# Patient Record
Sex: MALE | Race: WHITE | Employment: FULL TIME | URBAN - METROPOLITAN AREA
[De-identification: names, ages, dates, MRNs, and addresses within clinical notes are randomized per-mention and may not be internally consistent; named-entity substitution may affect disease eponyms.]

---

## 2017-03-13 ENCOUNTER — OFFICE VISIT (OUTPATIENT)
Dept: FAMILY MEDICINE CLINIC | Age: 27
End: 2017-03-13

## 2017-03-13 VITALS
HEIGHT: 76 IN | TEMPERATURE: 98.2 F | WEIGHT: 266.4 LBS | SYSTOLIC BLOOD PRESSURE: 134 MMHG | DIASTOLIC BLOOD PRESSURE: 95 MMHG | BODY MASS INDEX: 32.44 KG/M2 | HEART RATE: 69 BPM | RESPIRATION RATE: 19 BRPM | OXYGEN SATURATION: 100 %

## 2017-03-13 DIAGNOSIS — Z00.00 WELL ADULT EXAM: ICD-10-CM

## 2017-03-13 DIAGNOSIS — R41.840 POOR CONCENTRATION: ICD-10-CM

## 2017-03-13 DIAGNOSIS — F41.9 ANXIETY: Primary | ICD-10-CM

## 2017-03-13 RX ORDER — SERTRALINE HYDROCHLORIDE 25 MG/1
25 TABLET, FILM COATED ORAL DAILY
Qty: 60 TAB | Refills: 0 | Status: SHIPPED | OUTPATIENT
Start: 2017-03-13 | End: 2017-04-25

## 2017-03-13 NOTE — PROGRESS NOTES
Chief Complaint   Patient presents with   Mindy Christy Establish Care     Pt here to establish care with PCP. Pt states that he is currently having issues focusing, Pt states that he also has social anxiety, and concerns about talking to people.

## 2017-03-13 NOTE — MR AVS SNAPSHOT
Visit Information Date & Time Provider Department Dept. Phone Encounter #  
 3/13/2017 10:30 AM Aure Rocha MD [address] [place] [address] [phone] [id number] Follow-up Instructions Return in about 2 weeks (around 3/27/2017) for follow up psych referral, anxiety. Upcoming Health Maintenance Date Due DTaP/Tdap/Td series (1 - Tdap) 5/4/2010 INFLUENZA AGE 9 TO ADULT 8/1/2016 Allergies as of 3/13/2017  Review Complete On: 3/13/2017 By: Aure Rocha MD  
 No Known Allergies Current Immunizations  Never Reviewed No immunizations on file. Not reviewed this visit You Were Diagnosed With   
  
 Codes Comments Anxiety    -  Primary ICD-10-CM: F41.9 ICD-9-CM: 300.00 Well adult exam     ICD-10-CM: Z00.00 ICD-9-CM: V70.0 Poor concentration     ICD-10-CM: R41.840 ICD-9-CM: 799.51 Vitals BP Pulse Temp Resp Height(growth percentile) Weight(growth percentile) (!) 134/95 69 98.2 °F (36.8 °C) (Oral) 19 6' 3.5\" (1.918 m) 266 lb 6.4 oz (120.8 kg) SpO2 BMI Smoking Status 100% 32.86 kg/m2 Never Smoker BMI and BSA Data Body Mass Index Body Surface Area  
 32.86 kg/m 2 2.54 m 2 Preferred Pharmacy Pharmacy Name Phone 310 [facility], [address] ([address] [phone] Your Updated Medication List  
  
   
This list is accurate as of: 3/13/17 10:58 AM.  Always use your most recent med list.  
  
  
  
  
 sertraline 25 mg tablet Commonly known as:  ZOLOFT Take 1 Tab by mouth daily. In 4 days, may take 2 tabs (50mg)  per dose, for anxiety Prescriptions Sent to Pharmacy Refills  
 sertraline (ZOLOFT) 25 mg tablet 0 Sig: Take 1 Tab by mouth daily. In 4 days, may take 2 tabs (50mg)  per dose, for anxiety  Class: Normal  
 [facility] [address] #: [phone] Route: Oral  
  
We Performed the Following REFERRAL TO PSYCHIATRY [REF91 Custom] Follow-up Instructions Return in about 2 weeks (around 3/27/2017) for follow up psych referral, anxiety. Referral Information Referral ID Referred By Referred To  
  
 [id number] ALEXANDR, [address] MD Lennie   
   [address] [facility] [address] [place] [address], [address] [facility] [address] Phone: [phone] Fax: [phone] Visits Status Start Date End Date 1 New Request 3/13/17 3/13/18 If your referral has a status of pending review or denied, additional information will be sent to support the outcome of this decision. Patient Instructions Take the zoloft (sertraline) as directed starting with one 25 mg  tablet once daily. This medication may take up to one month to work fully, but you should start feeling an improvement in mood within 4-5 days. You may increase the dose to 50 mg if your mood needs further improvement. Call me in one week or so to update me as to  how you are doing. Sertraline (By mouth) Sertraline (SER-tra-karson) Treats depression, obsessive-compulsive disorder (OCD), posttraumatic stress disorder (PTSD), premenstrual dysphoric disorder (PMDD), social anxiety disorder, and panic disorder. This medicine is an SSRI. Brand Name(s):Zoloft There may be other brand names for this medicine. When This Medicine Should Not Be Used: This medicine is not right for everyone. Do not use it if you had an allergic reaction to sertraline. How to Use This Medicine:  
Liquid, Tablet · Take your medicine as directed. Your dose may need to be changed several times to find what works best for you. You may need to take it for a few weeks or months before you feel better. · Oral liquid: Use the dropper provided to remove the medicine and mix it with 1/2 cup (4 ounces) of water, ginger ale, lemon-lime soda, lemonade, or orange juice. Drink the mixture right away. It is normal for it to look a bit hazy. · This medicine should come with a Medication Guide. Ask your pharmacist for a copy if you do not have one. · Missed dose: Take a dose as soon as you remember. If it is almost time for your next dose, wait until then and take a regular dose. Do not take extra medicine to make up for a missed dose. · Store the medicine in a closed container at room temperature, away from heat, moisture, and direct light. Drugs and Foods to Avoid: Ask your doctor or pharmacist before using any other medicine, including over-the-counter medicines, vitamins, and herbal products. · Do not use this medicine together with pimozide. Do not use this medicine and an MAO inhibitor (MAOI) within 14 days of each other. Do not use the oral liquid form of sertraline if you are also using disulfiram. 
· Some medicines can affect how sertraline works. Tell your doctor if you are using the following:  
¨ Buspirone, cimetidine, cisapride, diazepam, digitoxin, fentanyl, flecainide, lithium, phenytoin, propafenone, Kyleigh's wort, tramadol, tryptophan supplements, or valproate ¨ A blood thinner (such as warfarin), a diuretic (water pill), an NSAID pain or arthritis medicine (such as aspirin, diclofenac, ibuprofen), a tricyclic antidepressant, a triptan medicine for migraine headaches · Do not drink alcohol while you are using this medicine. Warnings While Using This Medicine: · Tell your doctor if you are pregnant or breastfeeding, or if you have liver disease, bleeding problems, glaucoma, heart disease, or a seizure disorder. · For some children, teenagers, and young adults, this medicine may increase mental or emotional problems.  This may lead to thoughts of suicide and violence. Talk with your doctor right away if you have any thoughts or behavior changes that concern you. Tell your doctor if you or anyone in your family has a history of bipolar disorder or suicide attempts. · This medicine may cause the following problems:  
¨ Serotonin syndrome (when taken with certain medicines) ¨ Low sodium levels (more common in elderly patients and those who take diuretics or become dehydrated) · Tell your doctor if you are sensitive to latex, because the oral liquid comes with a latex rubber dropper. · This medicine may make you dizzy or drowsy. Do not drive or do anything that could be dangerous until you know how this medicine affects you. · Do not stop using this medicine suddenly. Your doctor will need to slowly decrease your dose before you stop it completely. · Your doctor will check your progress and the effects of this medicine at regular visits. Keep all appointments. · Keep all medicine out of the reach of children. Never share your medicine with anyone. Possible Side Effects While Using This Medicine:  
Call your doctor right away if you notice any of these side effects: · Allergic reaction: Itching or hives, swelling in your face or hands, swelling or tingling in your mouth or throat, chest tightness, trouble breathing · Anxiety, restlessness, fast heartbeat, fever, sweating, muscle spasms, twitching, nausea, vomiting, diarrhea, seeing or hearing things that are not there · Blistering, peeling, or red skin rash · Confusion, weakness, and muscle twitching · Eye pain, vision changes, seeing halos around lights · Feeling more excited or energetic than usual 
· Thoughts of hurting yourself or others, unusual behavior · Unusual bleeding or bruising If you notice these less serious side effects, talk with your doctor: · Dry mouth · Loss of appetite, weight loss · Mild diarrhea, constipation, nausea, vomiting · Sexual problems · Sleepiness, or trouble sleeping If you notice other side effects that you think are caused by this medicine, tell your doctor. Call your doctor for medical advice about side effects. You may report side effects to FDA at 8-068-SNO-9303 © 2016 [address] is for End User's use only and may not be sold, redistributed or otherwise used for commercial purposes. The above information is an  only. It is not intended as medical advice for individual conditions or treatments. Talk to your doctor, nurse or pharmacist before following any medical regimen to see if it is safe and effective for you. Introducing [facility]! Jackie Suresh introduces PurposeMatch (formerly SPARXlife) patient portal. Now you can access parts of your medical record, email your doctor's office, and request medication refills online. 1. In your internet browser, go to [email or web address] 2. Click on the First Time User? Click Here link in the Sign In box. You will see the New Member Sign Up page. 3. Enter your PurposeMatch (formerly SPARXlife) Access Code exactly as it appears below. You will not need to use this code after youve completed the sign-up process. If you do not sign up before the expiration date, you must request a new code. · PurposeMatch (formerly SPARXlife) Access Code: [email or web address] Expires: 6/11/2017 10:51 AM 
 
4. Enter the last four digits of your Social Security Number (xxxx) and Date of Birth (mm/dd/yyyy) as indicated and click Submit. You will be taken to the next sign-up page. 5. Create a Animailt ID. This will be your PurposeMatch (formerly SPARXlife) login ID and cannot be changed, so think of one that is secure and easy to remember. 6. Create a PurposeMatch (formerly SPARXlife) password. You can change your password at any time. 7. Enter your Password Reset Question and Answer. This can be used at a later time if you forget your password. 8. Enter your e-mail address. You will receive e-mail notification when new information is available in [address] 9. Click Sign Up. You can now view and download portions of your medical record. 10. Click the Download Summary menu link to download a portable copy of your medical information. If you have questions, please visit the Frequently Asked Questions section of the Artabase website. Remember, Artabase is NOT to be used for urgent needs. For medical emergencies, dial 911. Now available from your iPhone and Android! Please provide this summary of care documentation to your next provider. If you have any questions after today's visit, please call [phone].

## 2017-03-13 NOTE — PATIENT INSTRUCTIONS
Take the zoloft (sertraline) as directed starting with one 25 mg  tablet once daily. This medication may take up to one month to work fully, but you should start feeling an improvement in mood within 4-5 days. You may increase the dose to 50 mg if your mood needs further improvement. Call me in one week or so to update me as to  how you are doing. Sertraline (By mouth)   Sertraline (SER-tra-karson)  Treats depression, obsessive-compulsive disorder (OCD), posttraumatic stress disorder (PTSD), premenstrual dysphoric disorder (PMDD), social anxiety disorder, and panic disorder. This medicine is an SSRI. Brand Name(s):Zoloft   There may be other brand names for this medicine. When This Medicine Should Not Be Used: This medicine is not right for everyone. Do not use it if you had an allergic reaction to sertraline. How to Use This Medicine:   Liquid, Tablet  · Take your medicine as directed. Your dose may need to be changed several times to find what works best for you. You may need to take it for a few weeks or months before you feel better. · Oral liquid: Use the dropper provided to remove the medicine and mix it with 1/2 cup (4 ounces) of water, ginger ale, lemon-lime soda, lemonade, or orange juice. Drink the mixture right away. It is normal for it to look a bit hazy. · This medicine should come with a Medication Guide. Ask your pharmacist for a copy if you do not have one. · Missed dose: Take a dose as soon as you remember. If it is almost time for your next dose, wait until then and take a regular dose. Do not take extra medicine to make up for a missed dose. · Store the medicine in a closed container at room temperature, away from heat, moisture, and direct light. Drugs and Foods to Avoid:   Ask your doctor or pharmacist before using any other medicine, including over-the-counter medicines, vitamins, and herbal products. · Do not use this medicine together with pimozide.  Do not use this medicine and an MAO inhibitor (MAOI) within 14 days of each other. Do not use the oral liquid form of sertraline if you are also using disulfiram.  · Some medicines can affect how sertraline works. Tell your doctor if you are using the following:   ¨ Buspirone, cimetidine, cisapride, diazepam, digitoxin, fentanyl, flecainide, lithium, phenytoin, propafenone, Kyleigh's wort, tramadol, tryptophan supplements, or valproate  ¨ A blood thinner (such as warfarin), a diuretic (water pill), an NSAID pain or arthritis medicine (such as aspirin, diclofenac, ibuprofen), a tricyclic antidepressant, a triptan medicine for migraine headaches  · Do not drink alcohol while you are using this medicine. Warnings While Using This Medicine:   · Tell your doctor if you are pregnant or breastfeeding, or if you have liver disease, bleeding problems, glaucoma, heart disease, or a seizure disorder. · For some children, teenagers, and young adults, this medicine may increase mental or emotional problems. This may lead to thoughts of suicide and violence. Talk with your doctor right away if you have any thoughts or behavior changes that concern you. Tell your doctor if you or anyone in your family has a history of bipolar disorder or suicide attempts. · This medicine may cause the following problems:   ¨ Serotonin syndrome (when taken with certain medicines)  ¨ Low sodium levels (more common in elderly patients and those who take diuretics or become dehydrated)  · Tell your doctor if you are sensitive to latex, because the oral liquid comes with a latex rubber dropper. · This medicine may make you dizzy or drowsy. Do not drive or do anything that could be dangerous until you know how this medicine affects you. · Do not stop using this medicine suddenly. Your doctor will need to slowly decrease your dose before you stop it completely. · Your doctor will check your progress and the effects of this medicine at regular visits.  Keep all appointments. · Keep all medicine out of the reach of children. Never share your medicine with anyone. Possible Side Effects While Using This Medicine:   Call your doctor right away if you notice any of these side effects:  · Allergic reaction: Itching or hives, swelling in your face or hands, swelling or tingling in your mouth or throat, chest tightness, trouble breathing  · Anxiety, restlessness, fast heartbeat, fever, sweating, muscle spasms, twitching, nausea, vomiting, diarrhea, seeing or hearing things that are not there  · Blistering, peeling, or red skin rash  · Confusion, weakness, and muscle twitching  · Eye pain, vision changes, seeing halos around lights  · Feeling more excited or energetic than usual  · Thoughts of hurting yourself or others, unusual behavior  · Unusual bleeding or bruising  If you notice these less serious side effects, talk with your doctor:   · Dry mouth  · Loss of appetite, weight loss  · Mild diarrhea, constipation, nausea, vomiting  · Sexual problems  · Sleepiness, or trouble sleeping  If you notice other side effects that you think are caused by this medicine, tell your doctor. Call your doctor for medical advice about side effects. You may report side effects to FDA at 2-517-FDA-5367  © 2016 [address] is for End User's use only and may not be sold, redistributed or otherwise used for commercial purposes. The above information is an  only. It is not intended as medical advice for individual conditions or treatments. Talk to your doctor, nurse or pharmacist before following any medical regimen to see if it is safe and effective for you.

## 2017-03-13 NOTE — PROGRESS NOTES
Subjective:   Wade Hodgkins is a 31 y.o. male presenting for his annual checkup. He is from [place], works in [employer]. Pt has c/o anxiety, social anxiety meeting strangers, hierarchy, and this interferes with his work. And relationships with others    He c/o feeling out of focus, inability to get work done in timely fashion. He has always had this problem, now interferes with work. ROS:  Feeling well. No dyspnea or chest pain on exertion. No abdominal pain, change in bowel habits, black or bloody stools. No urinary tract or prostatic symptoms. No neurological complaints. Specific concerns today:  As above. Patient Active Problem List   Diagnosis Code    Well adult exam Z00.00    Anxiety F41.9    Poor concentration R41.840            Objective:     Visit Vitals    BP (!) 134/95    Pulse 69    Temp 98.2 °F (36.8 °C) (Oral)    Resp 19    Ht 6' 3.5\" (1.918 m)    Wt 266 lb 6.4 oz (120.8 kg)    SpO2 100%    BMI 32.86 kg/m2     The patient appears well, alert, oriented x 3, in no distress. ENT normal.  Neck supple. No adenopathy or thyromegaly. ANNA. Lungs are clear, good air entry, no wheezes, rhonchi or rales. S1 and S2 normal, no murmurs, regular rate and rhythm. Abdomen is soft without tenderness, guarding, mass or organomegaly. Extremities show no edema, normal peripheral pulses. Neurological is normal without focal findings. Assessment/Plan:   healthy adult male with c/o concentration difficulty c/w ADHD, and social anxiety. 1. Well adult exam      2. Anxiety  social anxiety. No depression.   - sertraline (ZOLOFT) 25 mg tablet; Take 1 Tab by mouth daily. In 4 days, may take 2 tabs (50mg)  per dose, for anxiety  Dispense: 60 Tab; Refill: 0    3. Poor concentration   Pt would benefit from eval for ADHD.  - REFERRAL TO PSYCHIATRY      continue present plan. Follow-up Disposition:  Return in about 2 weeks (around 3/27/2017) for follow up psych referral, anxiety. .    I have discussed the diagnosis with the patient and the intended treatment plan as seen in the above orders. Patient has provided input and agrees with goals. The patient has received an after-visit summary and questions were answered concerning future plans. I have discussed medication side effects and warnings with the patient as well.

## 2017-03-28 ENCOUNTER — OFFICE VISIT (OUTPATIENT)
Dept: BEHAVIORAL/MENTAL HEALTH CLINIC | Age: 27
End: 2017-03-28

## 2017-03-28 VITALS
HEIGHT: 74 IN | BODY MASS INDEX: 33.24 KG/M2 | DIASTOLIC BLOOD PRESSURE: 88 MMHG | HEART RATE: 65 BPM | WEIGHT: 259 LBS | SYSTOLIC BLOOD PRESSURE: 134 MMHG

## 2017-03-28 DIAGNOSIS — F98.8 ADD (ATTENTION DEFICIT DISORDER): Primary | ICD-10-CM

## 2017-03-28 DIAGNOSIS — F41.9 ANXIETY: ICD-10-CM

## 2017-03-28 RX ORDER — DEXTROAMPHETAMINE SACCHARATE, AMPHETAMINE ASPARTATE MONOHYDRATE, DEXTROAMPHETAMINE SULFATE AND AMPHETAMINE SULFATE 2.5; 2.5; 2.5; 2.5 MG/1; MG/1; MG/1; MG/1
10 CAPSULE, EXTENDED RELEASE ORAL
Qty: 30 CAP | Refills: 0 | Status: SHIPPED | OUTPATIENT
Start: 2017-03-28 | End: 2017-04-25 | Stop reason: SDUPTHER

## 2017-03-28 NOTE — MR AVS SNAPSHOT
Visit Information Date & Time Provider Department Dept. Phone Encounter #  
 3/28/2017  9:00 AM [facility] [phone] [id number] Follow-up Instructions Return in about 4 weeks (around 4/25/2017). Upcoming Health Maintenance Date Due DTaP/Tdap/Td series (1 - Tdap) 5/4/2010 INFLUENZA AGE 9 TO ADULT 8/1/2016 Allergies as of 3/28/2017  Review Complete On: 3/28/2017 By: Mckenzie Sutton MD  
 No Known Allergies Current Immunizations  Never Reviewed No immunizations on file. Not reviewed this visit You Were Diagnosed With   
  
 Codes Comments ADD (attention deficit disorder)    -  Primary ICD-10-CM: F98.8 ICD-9-CM: 314.00 Vitals BP Pulse Height(growth percentile) Weight(growth percentile) BMI Smoking Status 134/88 65 6' 2\" (1.88 m) 259 lb (117.5 kg) 33.25 kg/m2 Never Smoker Vitals History BMI and BSA Data Body Mass Index Body Surface Area  
 33.25 kg/m 2 2.48 m 2 Preferred Pharmacy Pharmacy Name Phone 310 [facility] [address] ([address] [phone] Your Updated Medication List  
  
   
This list is accurate as of: 3/28/17  9:31 AM.  Always use your most recent med list.  
  
  
  
  
 amphetamine-dextroamphetamine XR 10 mg XR capsule Commonly known as:  ADDERALL XR Take 1 Cap (10 mg total) by mouth every morning. Max Daily Amount: 10 mg  
  
 sertraline 25 mg tablet Commonly known as:  ZOLOFT Take 1 Tab by mouth daily. In 4 days, may take 2 tabs (50mg)  per dose, for anxiety VITAMIN C PO Take  by mouth. Prescriptions Printed Refills  
 amphetamine-dextroamphetamine XR (ADDERALL XR) 10 mg XR capsule 0 Sig: Take 1 Cap (10 mg total) by mouth every morning. Max Daily Amount: 10 mg  
 Class: Print Route: Oral  
  
Follow-up Instructions Return in about 4 weeks (around 4/25/2017). Introducing [facility]! Yohana Espinosa introduces [employer]andware patient portal. Now you can access parts of your medical record, email your doctor's office, and request medication refills online. 1. In your internet browser, go to [email or web address] 2. Click on the First Time User? Click Here link in the Sign In box. You will see the New Member Sign Up page. 3. Enter your Demandware Access Code exactly as it appears below. You will not need to use this code after youve completed the sign-up process. If you do not sign up before the expiration date, you must request a new code. · Demandware Access Code: [email or web address] Expires: 6/11/2017 10:51 AM 
 
4. Enter the last four digits of your Social Security Number (xxxx) and Date of Birth (mm/dd/yyyy) as indicated and click Submit. You will be taken to the next sign-up page. 5. Create a Demandware ID. This will be your Demandware login ID and cannot be changed, so think of one that is secure and easy to remember. 6. Create a Demandware password. You can change your password at any time. 7. Enter your Password Reset Question and Answer. This can be used at a later time if you forget your password. 8. Enter your e-mail address. You will receive e-mail notification when new information is available in [address] 9. Click Sign Up. You can now view and download portions of your medical record. 10. Click the Download Summary menu link to download a portable copy of your medical information. If you have questions, please visit the Frequently Asked Questions section of the Demandware website. Remember, Demandware is NOT to be used for urgent needs. For medical emergencies, dial 911. Now available from your iPhone and Android! Please provide this summary of care documentation to your next provider. If you have any questions after today's visit, please call [phone].

## 2017-03-28 NOTE — PROGRESS NOTES
Ambulatory Initial Psychiatric Evaluation     Chief Complaint:   Chief Complaint   Patient presents with    New Patient     referred by PCP for ? ADD        History of Present Illness: Kinjal Campuzano is a 31 y.o. Single, White male who presents with no significant MH problems, in excellent physical health, no h/o illicit drugs or alcohol, was referred with c/o difficulties with focus and attention. 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 His job performance is average. He took 8 years to finish his BS in Science as he is a procrastinator and had to drop many classes for not finishing his assignments. His average grade in HS was B and C. He does not remember the details of his date movies or parties. His GF gets annoyed by his absent mindedness. He is in good health. His half sister has ADD. No other family h/o MH problems. Denies any legal problems.       Past Psychiatric History:     Previous psychiatric care: agrees  His PCP gave him Zoloft for anxiety on 3/12/17, which he is not taking    Previous suicide attempts: none    Previous Hospitalizations: denies  none    Previous psychiatric medications/ECT/TMS: denies  none          History of rehab, detox, withdrawal: none    Social History:   Social History     Social History    Marital status: UNKNOWN     Spouse name: N/A    Number of children: N/A    Years of education: N/A     Social History Main Topics    Smoking status: Never Smoker    Smokeless tobacco: None    Alcohol use Yes      Comment: occasionally    Drug use: No    Sexual activity: Yes     Partners: Female     Birth control/ protection: Condom     Other Topics Concern    None     Social History Narrative        Ethnic:   Relationship Status: single  Living Situation: Alone   Employment: Works for [employer] in the field. X 2 years. Has BS degree  Tobacco/Caffeine/Alchol use: no alcohol use  Illicit Drug Social History:  no history of illicit drug use  Hobbies:  music  Sexual:  heterosexual    Family History:   History reviewed. No pertinent family history. Past Medical History:   Past Medical History:   Diagnosis Date    Migraine     started in middle school         Allergies:   No Known Allergies     Medication List:   Current Outpatient Prescriptions   Medication Sig Dispense Refill    ASCORBATE CALCIUM (VITAMIN C PO) Take  by mouth.  amphetamine-dextroamphetamine XR (ADDERALL XR) 10 mg XR capsule Take 1 Cap (10 mg total) by mouth every morning. Max Daily Amount: 10 mg 30 Cap 0    sertraline (ZOLOFT) 25 mg tablet Take 1 Tab by mouth daily. In 4 days, may take 2 tabs (50mg)  per dose, for anxiety 60 Tab 0      ROS:  Constitutional: negative for fevers and fatigue  Respiratory: negative for cough or wheezing  Cardiovascular: negative for chest pain, fatigue  Gastrointestinal: negative for reflux symptoms, nausea and vomiting  Musculoskeletal:negative for myalgias and stiff joints   Behavioral: denies depression or anxiety    Psychiatric/Mental Status Examination: This is a young WM with pretty good presentation of mild ADD.      MENTAL STATUS EXAM:  Sensorium  oriented to time, place and person   Orientation person, place, time/date, situation, day of week, month of year and year   Relations cooperative   Eye Contact appropriate   Appearance:  age appropriate and casually dressed   Motor Behavior:  within normal limits   Speech:  normal pitch and normal volume   Vocabulary average   Thought Process: goal directed and logical   Thought Content free of delusions and free of hallucinations   Suicidal ideations none   Homicidal ideations none   Mood:  euthymic   Affect:  anxious   Memory recent  adequate   Memory remote:  adequate   Concentration:  adequate   Abstraction:  abstract   Insight:  good   Reliability good Judgment:  good   Diagnoses:   Axis I: ADD, Anxiety d/o  Axis II: Deferred  Axis III: none  Axis IV: Occupational problems and Other psychosocial or environmental problems  Axis V:51-60 moderate symptoms    Treatment Plan:   1. Medication: begin Adderall XR 10 mg, 1 PO AM  2. Discussed: the potential medication side effects  headache, insomnia, libido increased, weight loss  patient given opportunity to ask questions  3. Psychotherapy: none recommended at this time  4. Medical: with PCP  5. Return to Clinic: Follow-up Disposition:  Return in about 4 weeks (around 4/25/2017). The risk versus benefits of treatment were discussed and side effects explained. Patient agreed with plan. Patient instructed to call with any side effects.      Time spent with Patient:  30 to 74 minutes    Leonel Santoro MD  3/28/2017

## 2017-04-25 ENCOUNTER — OFFICE VISIT (OUTPATIENT)
Dept: BEHAVIORAL/MENTAL HEALTH CLINIC | Age: 27
End: 2017-04-25

## 2017-04-25 VITALS
HEART RATE: 67 BPM | WEIGHT: 252 LBS | DIASTOLIC BLOOD PRESSURE: 98 MMHG | BODY MASS INDEX: 32.34 KG/M2 | SYSTOLIC BLOOD PRESSURE: 133 MMHG | HEIGHT: 74 IN

## 2017-04-25 DIAGNOSIS — F98.8 ADD (ATTENTION DEFICIT DISORDER): Primary | ICD-10-CM

## 2017-04-25 DIAGNOSIS — F41.9 ANXIETY: ICD-10-CM

## 2017-04-25 RX ORDER — DEXTROAMPHETAMINE SACCHARATE, AMPHETAMINE ASPARTATE MONOHYDRATE, DEXTROAMPHETAMINE SULFATE AND AMPHETAMINE SULFATE 5; 5; 5; 5 MG/1; MG/1; MG/1; MG/1
20 CAPSULE, EXTENDED RELEASE ORAL
Qty: 30 CAP | Refills: 0 | Status: SHIPPED | OUTPATIENT
Start: 2017-04-25 | End: 2017-04-25 | Stop reason: SDUPTHER

## 2017-04-25 RX ORDER — DEXTROAMPHETAMINE SACCHARATE, AMPHETAMINE ASPARTATE MONOHYDRATE, DEXTROAMPHETAMINE SULFATE AND AMPHETAMINE SULFATE 5; 5; 5; 5 MG/1; MG/1; MG/1; MG/1
20 CAPSULE, EXTENDED RELEASE ORAL
Qty: 30 CAP | Refills: 0 | Status: SHIPPED | OUTPATIENT
Start: 2017-05-25 | End: 2017-06-24

## 2017-04-25 NOTE — MR AVS SNAPSHOT
Visit Information Date & Time Provider Department Dept. Phone Encounter #  
 4/25/2017  9:20 AM [facility] [phone] [id number] Follow-up Instructions Return in about 2 months (around 6/25/2017). Upcoming Health Maintenance Date Due DTaP/Tdap/Td series (1 - Tdap) 5/4/2010 INFLUENZA AGE 9 TO ADULT 8/1/2016 Allergies as of 4/25/2017  Review Complete On: 4/25/2017 By: Tawanna Sheriff MD  
 No Known Allergies Current Immunizations  Never Reviewed No immunizations on file. Not reviewed this visit You Were Diagnosed With   
  
 Codes Comments ADD (attention deficit disorder)    -  Primary ICD-10-CM: F98.8 ICD-9-CM: 314.00 Anxiety     ICD-10-CM: F41.9 ICD-9-CM: 300.00 Vitals BP Pulse Height(growth percentile) Weight(growth percentile) BMI Smoking Status (!) 133/98 67 6' 2\" (1.88 m) 252 lb (114.3 kg) 32.35 kg/m2 Never Smoker Vitals History BMI and BSA Data Body Mass Index Body Surface Area  
 32.35 kg/m 2 2.44 m 2 Preferred Pharmacy Pharmacy Name Phone [address] [facility], [address] ([address] [phone] Your Updated Medication List  
  
   
This list is accurate as of: 4/25/17  9:34 AM.  Always use your most recent med list.  
  
  
  
  
 amphetamine-dextroamphetamine XR 20 mg XR capsule Commonly known as:  ADDERALL XR Take 1 Cap (20 mg total) by mouth daily (after breakfast)Indications: ATTENTION-DEFICIT HYPERACTIVITY DISORDER Earliest Fill Date: 5/25/17. Max Daily Amount: 20 mg  
Start taking on:  5/25/2017  
  
 sertraline 25 mg tablet Commonly known as:  ZOLOFT Take 1 Tab by mouth daily. In 4 days, may take 2 tabs (50mg)  per dose, for anxiety VITAMIN C PO Take  by mouth. Prescriptions Printed Refills amphetamine-dextroamphetamine XR (ADDERALL XR) 20 mg XR capsule 0 Starting on: 5/25/2017 Sig: Take 1 Cap (20 mg total) by mouth daily (after breakfast)Indications: ATTENTION-DEFICIT HYPERACTIVITY DISORDER Earliest Fill Date: 5/25/17. Max Daily Amount: 20 mg  
 Class: Print Route: Oral  
  
Follow-up Instructions Return in about 2 months (around 6/25/2017). Introducing [facility] & [facility] SERVICES! [employer] introduces [employer]itt patient portal. Now you can access parts of your medical record, email your doctor's office, and request medication refills online. 1. In your internet browser, go to [email or web address] 2. Click on the First Time User? Click Here link in the Sign In box. You will see the New Member Sign Up page. 3. Enter your Docitt Access Code exactly as it appears below. You will not need to use this code after youve completed the sign-up process. If you do not sign up before the expiration date, you must request a new code. · Docitt Access Code: [email or web address] Expires: 6/11/2017 10:51 AM 
 
4. Enter the last four digits of your Social Security Number (xxxx) and Date of Birth (mm/dd/yyyy) as indicated and click Submit. You will be taken to the next sign-up page. 5. Create a Docitt ID. This will be your Docitt login ID and cannot be changed, so think of one that is secure and easy to remember. 6. Create a Docitt password. You can change your password at any time. 7. Enter your Password Reset Question and Answer. This can be used at a later time if you forget your password. 8. Enter your e-mail address. You will receive e-mail notification when new information is available in [address] 9. Click Sign Up. You can now view and download portions of your medical record. 10. Click the Download Summary menu link to download a portable copy of your medical information.  
 
If you have questions, please visit the Frequently Asked Questions section of the [employer]. Remember, [employer] is NOT to be used for urgent needs. For medical emergencies, dial 911. Now available from your iPhone and Android! Please provide this summary of care documentation to your next provider. If you have any questions after today's visit, please call [phone].

## 2017-04-25 NOTE — PROGRESS NOTES
Psychiatric Outpatient Progress Note    Account Number:  [de-identified]  Name: Caren Telles    SUBJECTIVE:   CHIEF COMPLAINT:  Caren Telles is a 31 y.o. Single, White male and was seen today for his first follow-up of psychiatric condition and psychotropic medication management. HPI:    Ajith Villareal reports the following psychiatric symptoms:  anxiety and ADD. The symptoms have been present for years3 and are of moderate severity. The symptoms occur daily. Pt was started on low dose Adderall last visit. He reports that he is almost 50% better. Later in the evening the effects does fizzles out. He has lost few lbs. His DBP is little high. Reports compliance with medication. Denies feeling high on Adderall at any time. No gross psychosis or briana. Adult ADHD Self-Report Scale Symptom Checklist- reviewed- consistent with Dx of ADD. Contributing factors include: no much work due to rain. Patient denies SI/HI/SIB. Side Effects:  none      Fam/Soc Hx (from [place] with updates):       REVIEW OF SYSTEMS:  Psychiatric:  ADD  Appetite:good - lost 7 lbs   Sleep: good       OBJECTIVE:                 Mental Status exam: WNL except for      Sensorium  oriented to time, place and person   Relations cooperative    Eye Contact    appropriate   Appearance:  age appropriate and casually dressed   Motor Behavior/Gait:  within normal limits   Speech:  normal pitch and normal volume   Thought Process: goal directed and logical   Thought Content free of delusions and free of hallucinations   Suicidal ideations none   Homicidal ideations none   Mood:  euthymic   Affect:  full range   Memory recent  adequate   Memory remote:  adequate   Concentration:  adequate   Abstraction:  concrete   Insight:  fair   Reliability fair   Judgment:  fair       MEDICAL DECISION MAKING  Data: pertinent labs, imaging, medical records and diagnostic tests reviewed and incorporated in diagnosis and treatment plan    No Known Allergies     Current Outpatient Prescriptions   Medication Sig Dispense Refill    [START ON 5/25/2017] amphetamine-dextroamphetamine XR (ADDERALL XR) 20 mg XR capsule Take 1 Cap (20 mg total) by mouth daily (after breakfast)Indications: ATTENTION-DEFICIT HYPERACTIVITY DISORDER Earliest Fill Date: 5/25/17. Max Daily Amount: 20 mg 30 Cap 0    ASCORBATE CALCIUM (VITAMIN C PO) Take  by mouth. Visit Vitals    BP (!) 133/98    Pulse 67    Ht 6' 2\" (1.88 m)    Wt 114.3 kg (252 lb)    BMI 32.35 kg/m2         Problems addressed today:    ICD-10-CM ICD-9-CM    1. ADD (attention deficit disorder) F98.8 314.00    2. Anxiety F41.9 300.00        Assessment:   Keanu Chauhan  is a 31 y.o.  male  is responding to treatment. Symptoms are improving. Patient denies SI/HI/SIB. No evidence of AH/VH or delusions. Risk Scoring- chronic illnesses and prescription drug management    Treatment Plan:  1. Medications:          Medication Changes/Adjustments: Increase Adderall XR 20 mg, 1 PO daily . Two hard prescriptions given. Current Outpatient Prescriptions   Medication Sig Dispense Refill    [START ON 5/25/2017] amphetamine-dextroamphetamine XR (ADDERALL XR) 20 mg XR capsule Take 1 Cap (20 mg total) by mouth daily (after breakfast)Indications: ATTENTION-DEFICIT HYPERACTIVITY DISORDER Earliest Fill Date: 5/25/17. Max Daily Amount: 20 mg 30 Cap 0    ASCORBATE CALCIUM (VITAMIN C PO) Take  by mouth. The following regarding medications was addressed:    (The risks and benefits of the proposed medication; the potential medication side effects ie    dry mouth, weight gain, GI upset, headache; patient given opportunity to ask questions)       2. Counseling and coordination of care including instructions for treatment, risks/benefits, risk factor reduction and patient/family education. He agrees with the plan. Patient instructed to call with any side effects, questions or issues.      3.  Follow-up Disposition:  Return in about 2 months (around 6/25/2017). 4. Strongly recommended to keep her bottle of Addrall in a safe place. PSYCHOTHERAPY:  approx 20 minutes  Type:  Supportive/Solution Focused psychotherapy provided  Focus:     Current problems:         Psychoeducation provided on Adderall. Treatment plan reviewed with patient-including diagnosis and medications    Wei San is progressing.     Lyn Paris MD  4/25/2017

## 2017-06-29 ENCOUNTER — OFFICE VISIT (OUTPATIENT)
Dept: BEHAVIORAL/MENTAL HEALTH CLINIC | Age: 27
End: 2017-06-29

## 2017-06-29 VITALS
SYSTOLIC BLOOD PRESSURE: 134 MMHG | DIASTOLIC BLOOD PRESSURE: 86 MMHG | BODY MASS INDEX: 32.21 KG/M2 | WEIGHT: 251 LBS | HEART RATE: 78 BPM | HEIGHT: 74 IN

## 2017-06-29 DIAGNOSIS — F41.9 ANXIETY: ICD-10-CM

## 2017-06-29 DIAGNOSIS — F98.8 ADD (ATTENTION DEFICIT DISORDER): Primary | ICD-10-CM

## 2017-06-29 PROBLEM — R41.840 POOR CONCENTRATION: Status: RESOLVED | Noted: 2017-03-13 | Resolved: 2017-06-29

## 2017-06-29 RX ORDER — DEXTROAMPHETAMINE SACCHARATE, AMPHETAMINE ASPARTATE MONOHYDRATE, DEXTROAMPHETAMINE SULFATE AND AMPHETAMINE SULFATE 2.5; 2.5; 2.5; 2.5 MG/1; MG/1; MG/1; MG/1
20 CAPSULE, EXTENDED RELEASE ORAL DAILY
Refills: 0 | COMMUNITY
Start: 2017-03-28 | End: 2017-06-29 | Stop reason: DRUGHIGH

## 2017-06-29 RX ORDER — DEXTROAMPHETAMINE SACCHARATE, AMPHETAMINE ASPARTATE MONOHYDRATE, DEXTROAMPHETAMINE SULFATE AND AMPHETAMINE SULFATE 5; 5; 5; 5 MG/1; MG/1; MG/1; MG/1
20 CAPSULE, EXTENDED RELEASE ORAL DAILY
Qty: 30 CAP | Refills: 0 | Status: SHIPPED | OUTPATIENT
Start: 2017-07-29 | End: 2017-06-29 | Stop reason: SDUPTHER

## 2017-06-29 RX ORDER — DEXTROAMPHETAMINE SACCHARATE, AMPHETAMINE ASPARTATE MONOHYDRATE, DEXTROAMPHETAMINE SULFATE AND AMPHETAMINE SULFATE 5; 5; 5; 5 MG/1; MG/1; MG/1; MG/1
20 CAPSULE, EXTENDED RELEASE ORAL DAILY
Qty: 30 CAP | Refills: 0 | Status: SHIPPED | OUTPATIENT
Start: 2017-08-29 | End: 2017-09-26 | Stop reason: SDUPTHER

## 2017-06-29 RX ORDER — DEXTROAMPHETAMINE SACCHARATE, AMPHETAMINE ASPARTATE MONOHYDRATE, DEXTROAMPHETAMINE SULFATE AND AMPHETAMINE SULFATE 5; 5; 5; 5 MG/1; MG/1; MG/1; MG/1
20 CAPSULE, EXTENDED RELEASE ORAL DAILY
Qty: 30 CAP | Refills: 0 | Status: SHIPPED | OUTPATIENT
Start: 2017-06-29 | End: 2017-06-29 | Stop reason: SDUPTHER

## 2017-06-29 NOTE — MR AVS SNAPSHOT
Visit Information Date & Time Provider Department Dept. Phone Encounter #  
 6/29/2017  8:40 AM Savage Ansari, [address] [facility] [phone] [id number] Follow-up Instructions Return in about 3 months (around 9/29/2017). Upcoming Health Maintenance Date Due DTaP/Tdap/Td series (1 - Tdap) 5/4/2010 INFLUENZA AGE 9 TO ADULT 8/1/2017 Allergies as of 6/29/2017  Review Complete On: 6/29/2017 By: Savage Ansari MD  
 No Known Allergies Current Immunizations  Never Reviewed No immunizations on file. Not reviewed this visit You Were Diagnosed With   
  
 Codes Comments ADD (attention deficit disorder)    -  Primary ICD-10-CM: F98.8 ICD-9-CM: 314.00 Anxiety     ICD-10-CM: F41.9 ICD-9-CM: 300.00 Vitals BP Pulse Height(growth percentile) Weight(growth percentile) BMI Smoking Status 134/86 78 6' 2\" (1.88 m) 251 lb (113.9 kg) 32.23 kg/m2 Never Smoker Vitals History BMI and BSA Data Body Mass Index Body Surface Area  
 32.23 kg/m 2 2.44 m 2 Preferred Pharmacy Pharmacy Name Phone [address] [facility], [address] ([address] [phone] Your Updated Medication List  
  
   
This list is accurate as of: 6/29/17  9:01 AM.  Always use your most recent med list.  
  
  
  
  
 amphetamine-dextroamphetamine XR 20 mg XR capsule Commonly known as:  ADDERALL XR Take 1 Cap (20 mg total) by mouth dailyIndications: ATTENTION-DEFICIT HYPERACTIVITY DISORDER Earliest Fill Date: 8/29/17. Max Daily Amount: 20 mg  
Start taking on:  8/29/2017 VITAMIN C PO Take  by mouth. Prescriptions Printed Refills  
 amphetamine-dextroamphetamine XR (ADDERALL XR) 20 mg XR capsule 0 Starting on: 8/29/2017  Sig: Take 1 Cap (20 mg total) by mouth dailyIndications: ATTENTION-DEFICIT HYPERACTIVITY DISORDER Earliest Fill Date: 8/29/17. Max Daily Amount: 20 mg  
 Class: Print Route: Oral  
  
Follow-up Instructions Return in about 3 months (around 9/29/2017). Introducing [facility] & [facility] SERVICES! Antonella Cooper introduces [employer]Star patient portal. Now you can access parts of your medical record, email your doctor's office, and request medication refills online. 1. In your internet browser, go to [email or web address] 2. Click on the First Time User? Click Here link in the Sign In box. You will see the New Member Sign Up page. 3. Enter your PrismaStar Access Code exactly as it appears below. You will not need to use this code after youve completed the sign-up process. If you do not sign up before the expiration date, you must request a new code. · PrismaStar Access Code: [email or web address] Expires: 9/27/2017  9:01 AM 
 
4. Enter the last four digits of your Social Security Number (xxxx) and Date of Birth (mm/dd/yyyy) as indicated and click Submit. You will be taken to the next sign-up page. 5. Create a PrismaStar ID. This will be your PrismaStar login ID and cannot be changed, so think of one that is secure and easy to remember. 6. Create a PrismaStar password. You can change your password at any time. 7. Enter your Password Reset Question and Answer. This can be used at a later time if you forget your password. 8. Enter your e-mail address. You will receive e-mail notification when new information is available in [address] 9. Click Sign Up. You can now view and download portions of your medical record. 10. Click the Download Summary menu link to download a portable copy of your medical information. If you have questions, please visit the Frequently Asked Questions section of the PrismaStar website. Remember, [employer]Star is NOT to be used for urgent needs. For medical emergencies, dial 911. Now available from your iPhone and Android! Please provide this summary of care documentation to your next provider. If you have any questions after today's visit, please call [phone].

## 2017-09-26 ENCOUNTER — OFFICE VISIT (OUTPATIENT)
Dept: BEHAVIORAL/MENTAL HEALTH CLINIC | Age: 27
End: 2017-09-26

## 2017-09-26 VITALS
WEIGHT: 250 LBS | DIASTOLIC BLOOD PRESSURE: 88 MMHG | HEIGHT: 74 IN | BODY MASS INDEX: 32.08 KG/M2 | SYSTOLIC BLOOD PRESSURE: 127 MMHG | HEART RATE: 77 BPM

## 2017-09-26 DIAGNOSIS — F98.8 ADD (ATTENTION DEFICIT DISORDER): Primary | ICD-10-CM

## 2017-09-26 RX ORDER — DEXTROAMPHETAMINE SACCHARATE, AMPHETAMINE ASPARTATE MONOHYDRATE, DEXTROAMPHETAMINE SULFATE AND AMPHETAMINE SULFATE 5; 5; 5; 5 MG/1; MG/1; MG/1; MG/1
20 CAPSULE, EXTENDED RELEASE ORAL DAILY
Qty: 90 CAP | Refills: 0 | Status: SHIPPED | OUTPATIENT
Start: 2017-09-26 | End: 2017-10-04 | Stop reason: SDUPTHER

## 2017-09-26 NOTE — PROGRESS NOTES
Psychiatric Outpatient Progress Note    Account Number:  [de-identified]  Name: Ji Arzate    SUBJECTIVE:    CHIEF COMPLAINT:  Dante Moore a 28 y.o. Single, White male and was seen today for his 3 month follow-up of psychiatric condition and psychotropic medication management.       HPI:    Manpreet reports the following psychiatric symptoms:  anxiety and ADD.  The symptoms have been present for years  and are of mild severity. The symptoms occur daily. Pt  reports that he is much better. Reports compliance with medication. Stable sleep/appetite. No gross psychosis or briana. Reports work is good and busy. There was major shake up in his company and he survived the cuts. He and his GF are planning to take vacations in [place] soon. He also wanted to get my opinion on his sister's ADD medication.       Adult ADHD Self-Report Scale Symptom Checklist- reviewed- consistent with Dx of ADD.        Contributing factors include: none   Patient denies SI/HI/SIB.        Side Effects:  none        Fam/Soc Hx (from [facility] with updates):        REVIEW OF SYSTEMS:  Psychiatric:  ADD  Appetite:good   Sleep: good       OBJECTIVE:                 Mental Status exam: WNL except for      Sensorium  oriented to time, place and person   Relations cooperative    Eye Contact    appropriate   Appearance:  age appropriate and casually dressed   Motor Behavior/Gait:  within normal limits   Speech:  normal pitch and normal volume   Thought Process: logical   Thought Content free of delusions and free of hallucinations   Suicidal ideations none   Homicidal ideations none   Mood:  euthymic   Affect:  Broad and full   Memory recent  adequate   Memory remote:  adequate   Concentration:  adequate   Abstraction:  concrete   Insight:  fair   Reliability fair   Judgment:  fair       MEDICAL DECISION MAKING  Data: pertinent labs, imaging, medical records and diagnostic tests reviewed and incorporated in diagnosis and treatment plan    No Known Allergies Current Outpatient Prescriptions   Medication Sig Dispense Refill    amphetamine-dextroamphetamine XR (ADDERALL XR) 20 mg XR capsule Take 1 Cap (20 mg total) by mouth dailyIndications: ATTENTION-DEFICIT HYPERACTIVITY DISORDER. Max Daily Amount: 20 mg 90 Cap 0    ASCORBATE CALCIUM (VITAMIN C PO) Take  by mouth. Visit Vitals    /88    Pulse 77    Ht 6' 2\" (1.88 m)    Wt 113.4 kg (250 lb)    BMI 32.1 kg/m2         Problems addressed today:    ICD-10-CM ICD-9-CM    1. ADD (attention deficit disorder) F98.8 314.00        Assessment:   Renita Olivera  is a 28 y.o.  male  is responding to treatment. Symptoms are stable. Patient denies SI/HI/SIB. No evidence of AH/VH or delusions. Risk Scoring- chronic illnesses and prescription drug management    Treatment Plan:  1. Medications:          Medication Changes/Adjustments: continue Addrrall XR    Current Outpatient Prescriptions   Medication Sig Dispense Refill    amphetamine-dextroamphetamine XR (ADDERALL XR) 20 mg XR capsule Take 1 Cap (20 mg total) by mouth dailyIndications: ATTENTION-DEFICIT HYPERACTIVITY DISORDER. Max Daily Amount: 20 mg 90 Cap 0    ASCORBATE CALCIUM (VITAMIN C PO) Take  by mouth. The following regarding medications was addressed:    (The risks and benefits of the proposed medication; the potential medication side effects ie    dry mouth, weight gain, GI upset, headache; patient given opportunity to ask questions)       2. Counseling and coordination of care including instructions for treatment, risks/benefits, risk factor reduction and patient/family education. He agrees with the plan. Patient instructed to call with any side effects, questions or issues. 3.  Follow-up Disposition:  Return in about 3 months (around 12/26/2017).     PSYCHOTHERAPY:  approx 20 minutes  Type:  Supportive/Solution Focused psychotherapy provided  Focus:     Current problems:   Occupational issues     Psychoeducation provided on psych medication    Treatment plan reviewed with patient-including diagnosis and medications      Priyank Cee is progressing.     Robinson Nguyen MD  9/26/2017

## 2017-09-26 NOTE — MR AVS SNAPSHOT
Visit Information Date & Time Provider Department Dept. Phone Encounter #  
 9/26/2017  9:00 AM [place] [address] [phone] [id number] Follow-up Instructions Return in about 3 months (around 12/26/2017). Upcoming Health Maintenance Date Due DTaP/Tdap/Td series (1 - Tdap) 5/4/2010 INFLUENZA AGE 9 TO ADULT 8/1/2017 Allergies as of 9/26/2017  Review Complete On: 9/26/2017 By: Morris Isaacs MD  
 No Known Allergies Current Immunizations  Never Reviewed No immunizations on file. Not reviewed this visit You Were Diagnosed With   
  
 Codes Comments ADD (attention deficit disorder)    -  Primary ICD-10-CM: F98.8 ICD-9-CM: 314.00 Vitals BP Pulse Height(growth percentile) Weight(growth percentile) BMI Smoking Status 127/88 77 6' 2\" (1.88 m) 250 lb (113.4 kg) 32.1 kg/m2 Never Smoker BMI and BSA Data Body Mass Index Body Surface Area  
 32.1 kg/m 2 2.43 m 2 Preferred Pharmacy Pharmacy Name Phone 310 [facility] 53 91 [address] ([address] [phone] Your Updated Medication List  
  
   
This list is accurate as of: 9/26/17  9:05 AM.  Always use your most recent med list.  
  
  
  
  
 amphetamine-dextroamphetamine XR 20 mg XR capsule Commonly known as:  ADDERALL XR Take 1 Cap (20 mg total) by mouth dailyIndications: ATTENTION-DEFICIT HYPERACTIVITY DISORDER. Max Daily Amount: 20 mg  
  
 VITAMIN C PO Take  by mouth. Prescriptions Printed Refills  
 amphetamine-dextroamphetamine XR (ADDERALL XR) 20 mg XR capsule 0 Sig: Take 1 Cap (20 mg total) by mouth dailyIndications: ATTENTION-DEFICIT HYPERACTIVITY DISORDER. Max Daily Amount: 20 mg  
 Class: Print Route: Oral  
  
Follow-up Instructions Return in about 3 months (around 12/26/2017). Introducing [facility] & HEALTH SERVICES! Adan Rose introduces [employer] patient portal. Now you can access parts of your medical record, email your doctor's office, and request medication refills online. 1. In your internet browser, go to [email or web address]/[email or web address] 2. Click on the First Time User? Click Here link in the Sign In box. You will see the New Member Sign Up page. 3. Enter your [employer] Access Code exactly as it appears below. You will not need to use this code after youve completed the sign-up process. If you do not sign up before the expiration date, you must request a new code. · [employer] Access Code: [email or web address] Expires: 9/27/2017  9:01 AM 
 
4. Enter the last four digits of your Social Security Number (xxxx) and Date of Birth (mm/dd/yyyy) as indicated and click Submit. You will be taken to the next sign-up page. 5. Create a [employer] ID. This will be your [employer] login ID and cannot be changed, so think of one that is secure and easy to remember. 6. Create a [employer] password. You can change your password at any time. 7. Enter your Password Reset Question and Answer. This can be used at a later time if you forget your password. 8. Enter your e-mail address. You will receive e-mail notification when new information is available in [address] 9. Click Sign Up. You can now view and download portions of your medical record. 10. Click the Download Summary menu link to download a portable copy of your medical information. If you have questions, please visit the Frequently Asked Questions section of the [employer] website. Remember, [employer] is NOT to be used for urgent needs. For medical emergencies, dial 911. Now available from your iPhone and Android! Please provide this summary of care documentation to your next provider. If you have any questions after today's visit, please call [phone].

## 2017-10-04 ENCOUNTER — TELEPHONE (OUTPATIENT)
Dept: BEHAVIORAL/MENTAL HEALTH CLINIC | Age: 27
End: 2017-10-04

## 2017-10-04 RX ORDER — DEXTROAMPHETAMINE SACCHARATE, AMPHETAMINE ASPARTATE MONOHYDRATE, DEXTROAMPHETAMINE SULFATE AND AMPHETAMINE SULFATE 5; 5; 5; 5 MG/1; MG/1; MG/1; MG/1
20 CAPSULE, EXTENDED RELEASE ORAL DAILY
Qty: 30 CAP | Refills: 0 | Status: SHIPPED | OUTPATIENT
Start: 2017-10-30 | End: 2017-12-05 | Stop reason: SDUPTHER

## 2017-10-04 RX ORDER — DEXTROAMPHETAMINE SACCHARATE, AMPHETAMINE ASPARTATE MONOHYDRATE, DEXTROAMPHETAMINE SULFATE AND AMPHETAMINE SULFATE 5; 5; 5; 5 MG/1; MG/1; MG/1; MG/1
20 CAPSULE, EXTENDED RELEASE ORAL
Qty: 30 CAP | Refills: 0 | Status: SHIPPED | OUTPATIENT
Start: 2017-11-29 | End: 2018-06-19 | Stop reason: SDUPTHER

## 2017-10-04 NOTE — TELEPHONE ENCOUNTER
Patient filled last script for #30 only due to #90 can only be filled via mail order pharmacy. Requesting 2 more months of 30 days, and he will find out what his mail order pharmacy is.

## 2017-12-05 ENCOUNTER — OFFICE VISIT (OUTPATIENT)
Dept: BEHAVIORAL/MENTAL HEALTH CLINIC | Age: 27
End: 2017-12-05

## 2017-12-05 VITALS
SYSTOLIC BLOOD PRESSURE: 153 MMHG | HEIGHT: 74 IN | HEART RATE: 80 BPM | WEIGHT: 257 LBS | DIASTOLIC BLOOD PRESSURE: 96 MMHG | BODY MASS INDEX: 32.98 KG/M2

## 2017-12-05 DIAGNOSIS — F41.9 ANXIETY: ICD-10-CM

## 2017-12-05 DIAGNOSIS — F98.8 ATTENTION DEFICIT DISORDER (ADD) WITHOUT HYPERACTIVITY: Primary | ICD-10-CM

## 2017-12-05 PROBLEM — Z00.00 WELL ADULT EXAM: Status: RESOLVED | Noted: 2017-03-13 | Resolved: 2017-12-05

## 2017-12-05 RX ORDER — DEXTROAMPHETAMINE SACCHARATE, AMPHETAMINE ASPARTATE MONOHYDRATE, DEXTROAMPHETAMINE SULFATE AND AMPHETAMINE SULFATE 5; 5; 5; 5 MG/1; MG/1; MG/1; MG/1
20 CAPSULE, EXTENDED RELEASE ORAL DAILY
Qty: 30 CAP | Refills: 0 | Status: SHIPPED | OUTPATIENT
Start: 2018-02-28 | End: 2018-03-06 | Stop reason: SDUPTHER

## 2017-12-05 RX ORDER — DEXTROAMPHETAMINE SACCHARATE, AMPHETAMINE ASPARTATE MONOHYDRATE, DEXTROAMPHETAMINE SULFATE AND AMPHETAMINE SULFATE 5; 5; 5; 5 MG/1; MG/1; MG/1; MG/1
20 CAPSULE, EXTENDED RELEASE ORAL DAILY
Qty: 30 CAP | Refills: 0 | Status: SHIPPED | OUTPATIENT
Start: 2017-12-21 | End: 2017-12-05 | Stop reason: SDUPTHER

## 2017-12-05 RX ORDER — DEXTROAMPHETAMINE SACCHARATE, AMPHETAMINE ASPARTATE MONOHYDRATE, DEXTROAMPHETAMINE SULFATE AND AMPHETAMINE SULFATE 5; 5; 5; 5 MG/1; MG/1; MG/1; MG/1
20 CAPSULE, EXTENDED RELEASE ORAL DAILY
Qty: 30 CAP | Refills: 0 | Status: SHIPPED | OUTPATIENT
Start: 2018-01-30 | End: 2017-12-05 | Stop reason: SDUPTHER

## 2017-12-05 NOTE — MR AVS SNAPSHOT
Visit Information Date & Time Provider Department Dept. Phone Encounter #  
 12/5/2017  8:40 AM Joey Avila MD [facility] [address] [id number] Follow-up Instructions Return in about 3 months (around 3/5/2018). Your Appointments 3/6/2018  8:30 AM  
ESTABLISHED PATIENT with Joey Avila MD  
[address] AT [facility] [address]) Appt Note: 3 month fu  
 [address] [address]  
[address] [address] [address] [facility] Upcoming Health Maintenance Date Due DTaP/Tdap/Td series (1 - Tdap) 5/4/2010 Influenza Age 5 to Adult 8/1/2017 Allergies as of 12/5/2017  Review Complete On: 12/5/2017 By: Joey Avila MD  
 No Known Allergies Current Immunizations  Never Reviewed No immunizations on file. Not reviewed this visit You Were Diagnosed With   
  
 Codes Comments Attention deficit disorder (ADD) without hyperactivity    -  Primary ICD-10-CM: F98.8 ICD-9-CM: 314.00 Anxiety     ICD-10-CM: F41.9 ICD-9-CM: 300.00 Vitals BP Pulse Height(growth percentile) Weight(growth percentile) BMI Smoking Status (!) 153/96 80 6' 2\" (1.88 m) 257 lb (116.6 kg) 33 kg/m2 Never Smoker BMI and BSA Data Body Mass Index Body Surface Area  
 33 kg/m 2 2.47 m 2 Preferred Pharmacy Pharmacy Name Phone 310 [facility] 53 91 [address] ([address] [phone] Your Updated Medication List  
  
   
This list is accurate as of: 12/5/17  9:14 AM.  Always use your most recent med list.  
  
  
  
  
 * amphetamine-dextroamphetamine XR 20 mg XR capsule Commonly known as:  ADDERALL XR Take 1 Cap (20 mg total) by mouth every morningEarliest Fill Date: 11/29/17. Max Daily Amount: 20 mg  
  
 * amphetamine-dextroamphetamine XR 20 mg XR capsule Commonly known as:  ADDERALL XR Take 1 Cap (20 mg total) by mouth dailyIndications: Attention-Deficit Hyperactivity Disorder Earliest Fill Date: 2/28/18. Max Daily Amount: 20 mg  
Start taking on:  2/28/2018 VITAMIN C PO Take  by mouth. * Notice: This list has 2 medication(s) that are the same as other medications prescribed for you. Read the directions carefully, and ask your doctor or other care provider to review them with you. Prescriptions Printed Refills  
 amphetamine-dextroamphetamine XR (ADDERALL XR) 20 mg XR capsule 0 Starting on: 2/28/2018 Sig: Take 1 Cap (20 mg total) by mouth dailyIndications: Attention-Deficit Hyperactivity Disorder Earliest Fill Date: 2/28/18. Max Daily Amount: 20 mg  
 Class: Print Route: Oral  
  
Follow-up Instructions Return in about 3 months (around 3/5/2018). Introducing [facility] & [facility] SERVICES! Jaki Jennings introduces [employer] patient portal. Now you can access parts of your medical record, email your doctor's office, and request medication refills online. 1. In your internet browser, go to [email or web address] 2. Click on the First Time User? Click Here link in the Sign In box. You will see the New Member Sign Up page. 3. Enter your [employer] Access Code exactly as it appears below. You will not need to use this code after youve completed the sign-up process. If you do not sign up before the expiration date, you must request a new code. · [employer] Access Code: [email or web address] Expires: 3/5/2018  9:14 AM 
 
4. Enter the last four digits of your Social Security Number (xxxx) and Date of Birth (mm/dd/yyyy) as indicated and click Submit. You will be taken to the next sign-up page. 5. Create a [employer] ID. This will be your [employer] login ID and cannot be changed, so think of one that is secure and easy to remember. 6. Create a [employer] password. You can change your password at any time. 7. Enter your Password Reset Question and Answer. This can be used at a later time if you forget your password. 8. Enter your e-mail address. You will receive e-mail notification when new information is available in [address] 9. Click Sign Up. You can now view and download portions of your medical record. 10. Click the Download Summary menu link to download a portable copy of your medical information. If you have questions, please visit the Frequently Asked Questions section of the [employer] website. Remember, [employer] is NOT to be used for urgent needs. For medical emergencies, dial 911. Now available from your iPhone and Android! Please provide this summary of care documentation to your next provider. If you have any questions after today's visit, please call [phone].

## 2017-12-05 NOTE — PROGRESS NOTES
Psychiatric Outpatient Progress Note    Account Number:  [de-identified]  Name: Abril Reed    SUBJECTIVE:     CHIEF COMPLAINT:  Fanta Barrera a 27 y. o. Single, White male and was seen today for his 3 month follow-up of psychiatric condition and psychotropic medication management.       HPI:    Manpreet reports the following psychiatric symptoms:  anxiety and ADD.  The symptoms have been present for years  and are of mild severity. The symptoms occur daily.     Pt  reports that he is much better. He reports that he had excellent vacation with his GF in [place].  Reports compliance with medication. Stable sleep/appetite. No gross psychosis or briana. Reports work is good and busy. It continues to get reorganized. He has gained 7 lbs and his BP is high today. He has family history of hypertension. He drinks lots of coffee.             Contributing factors include: none   Patient denies SI/HI/SIB.        Side Effects:  none        Fam/Soc Hx (from Inial Eval with updates):      REVIEW OF SYSTEMS:  Constitutional: negative for fevers and fatigue  Eyes: positive for contacts/glasses  Ears, nose, mouth, throat, and face: negative for hearing loss and tinnitus  Respiratory: negative for cough or wheezing  Musculoskeletal:negative for myalgias and arthralgias  Neurological: negative for headaches and memory problems  Behavioral/Psych: positive for ADHD and anxiety, negative for SI or HI  Appetite:good   Sleep: good   Visit Vitals    BP (!) 153/96    Pulse 80    Ht 6' 2\" (1.88 m)    Wt 116.6 kg (257 lb)    BMI 33 kg/m2       OBJECTIVE:                 Mental Status exam: WNL except for      Sensorium  oriented to time, place and person   Relations cooperative and passive    Eye Contact    appropriate   Appearance:  age appropriate and casually dressed, overweight   Motor Behavior/Gait:  within normal limits   Speech:  normal pitch and normal volume   Thought Process: goal directed and logical   Thought Content free of delusions and free of hallucinations   Suicidal ideations none   Homicidal ideations none   Mood:  euthymic   Affect:  full range   Memory recent  adequate   Memory remote:  adequate   Concentration:  impaired   Abstraction:  abstract   Insight:  fair   Reliability fair   Judgment:  fair       MEDICAL DECISION MAKING  Data: pertinent labs, imaging, medical records and diagnostic tests reviewed and incorporated in diagnosis and treatment plan    No Known Allergies     Current Outpatient Prescriptions   Medication Sig Dispense Refill    [START ON 2/28/2018] amphetamine-dextroamphetamine XR (ADDERALL XR) 20 mg XR capsule Take 1 Cap (20 mg total) by mouth dailyIndications: Attention-Deficit Hyperactivity Disorder Earliest Fill Date: 2/28/18. Max Daily Amount: 20 mg 30 Cap 0    amphetamine-dextroamphetamine XR (ADDERALL XR) 20 mg XR capsule Take 1 Cap (20 mg total) by mouth every morningEarliest Fill Date: 11/29/17. Max Daily Amount: 20 mg 30 Cap 0    ASCORBATE CALCIUM (VITAMIN C PO) Take  by mouth. Problems addressed today:    ICD-10-CM ICD-9-CM    1. Attention deficit disorder (ADD) without hyperactivity F98.8 314.00    2. Anxiety F41.9 300.00        Assessment:   Brown Magana  is a 28 y.o.  male  is responding to treatment. Symptoms are stable. Patient denies SI/HI/SIB. No evidence of AH/VH or delusions. Risk Scoring- chronic illnesses and prescription drug management    Treatment Plan:  1. Medications:          Medication Changes/Adjustments: Continue Adderall XR 20 mg daily    Current Outpatient Prescriptions   Medication Sig Dispense Refill    [START ON 2/28/2018] amphetamine-dextroamphetamine XR (ADDERALL XR) 20 mg XR capsule Take 1 Cap (20 mg total) by mouth dailyIndications: Attention-Deficit Hyperactivity Disorder Earliest Fill Date: 2/28/18.   Max Daily Amount: 20 mg 30 Cap 0    amphetamine-dextroamphetamine XR (ADDERALL XR) 20 mg XR capsule Take 1 Cap (20 mg total) by mouth every morningEarliest Fill Date: 11/29/17. Max Daily Amount: 20 mg 30 Cap 0    ASCORBATE CALCIUM (VITAMIN C PO) Take  by mouth. The following regarding medications was addressed:    (The risks and benefits of the proposed medication; the potential medication side effects ie    dry mouth, weight gain, GI upset, headache; patient given opportunity to ask questions)       2. Counseling and coordination of care including instructions for treatment, risks/benefits, risk factor reduction and patient/family education. He agrees with the plan. Patient instructed to call with any side effects, questions or issues. 3. Pt was counseled on his continuous weight increase and higher BP. Encouraged daily workout and portion control. PSYCHOTHERAPY:  approx 20 minutes  Type:  Supportive/Cognitive Behavioral/Solution Focused psychotherapy provided  Focus:     Current problems:   Overweight   Occupational issues    Psychoeducation provided:  Psych medications    Treatment plan reviewed with patient-including diagnosis and medications    Eilleen Reason is progressing. Follow-up Disposition:  Return in about 3 months (around 3/5/2018).       Martah Hood MD  12/5/2017

## 2018-03-06 ENCOUNTER — OFFICE VISIT (OUTPATIENT)
Dept: BEHAVIORAL/MENTAL HEALTH CLINIC | Age: 28
End: 2018-03-06

## 2018-03-06 VITALS
DIASTOLIC BLOOD PRESSURE: 84 MMHG | BODY MASS INDEX: 32.34 KG/M2 | SYSTOLIC BLOOD PRESSURE: 133 MMHG | WEIGHT: 252 LBS | HEART RATE: 81 BPM | HEIGHT: 74 IN

## 2018-03-06 DIAGNOSIS — F98.8 ATTENTION DEFICIT DISORDER (ADD) WITHOUT HYPERACTIVITY: Primary | ICD-10-CM

## 2018-03-06 DIAGNOSIS — F41.9 ANXIETY: ICD-10-CM

## 2018-03-06 RX ORDER — DEXTROAMPHETAMINE SACCHARATE, AMPHETAMINE ASPARTATE MONOHYDRATE, DEXTROAMPHETAMINE SULFATE AND AMPHETAMINE SULFATE 5; 5; 5; 5 MG/1; MG/1; MG/1; MG/1
20 CAPSULE, EXTENDED RELEASE ORAL DAILY
Qty: 30 CAP | Refills: 0 | Status: SHIPPED | OUTPATIENT
Start: 2018-05-06 | End: 2018-06-19 | Stop reason: SDUPTHER

## 2018-03-06 RX ORDER — DEXTROAMPHETAMINE SACCHARATE, AMPHETAMINE ASPARTATE MONOHYDRATE, DEXTROAMPHETAMINE SULFATE AND AMPHETAMINE SULFATE 5; 5; 5; 5 MG/1; MG/1; MG/1; MG/1
20 CAPSULE, EXTENDED RELEASE ORAL DAILY
Qty: 30 CAP | Refills: 0 | Status: SHIPPED | OUTPATIENT
Start: 2018-03-06 | End: 2018-03-06 | Stop reason: SDUPTHER

## 2018-03-06 RX ORDER — DEXTROAMPHETAMINE SACCHARATE, AMPHETAMINE ASPARTATE MONOHYDRATE, DEXTROAMPHETAMINE SULFATE AND AMPHETAMINE SULFATE 5; 5; 5; 5 MG/1; MG/1; MG/1; MG/1
20 CAPSULE, EXTENDED RELEASE ORAL DAILY
Qty: 30 CAP | Refills: 0 | Status: SHIPPED | OUTPATIENT
Start: 2018-04-06 | End: 2018-03-06 | Stop reason: SDUPTHER

## 2018-06-19 ENCOUNTER — OFFICE VISIT (OUTPATIENT)
Dept: BEHAVIORAL/MENTAL HEALTH CLINIC | Age: 28
End: 2018-06-19

## 2018-06-19 VITALS
DIASTOLIC BLOOD PRESSURE: 81 MMHG | HEART RATE: 83 BPM | SYSTOLIC BLOOD PRESSURE: 125 MMHG | HEIGHT: 74 IN | WEIGHT: 260 LBS | BODY MASS INDEX: 33.37 KG/M2

## 2018-06-19 DIAGNOSIS — F98.8 ATTENTION DEFICIT DISORDER (ADD) WITHOUT HYPERACTIVITY: Primary | ICD-10-CM

## 2018-06-19 DIAGNOSIS — F41.9 ANXIETY: ICD-10-CM

## 2018-06-19 RX ORDER — DEXTROAMPHETAMINE SACCHARATE, AMPHETAMINE ASPARTATE MONOHYDRATE, DEXTROAMPHETAMINE SULFATE AND AMPHETAMINE SULFATE 5; 5; 5; 5 MG/1; MG/1; MG/1; MG/1
20 CAPSULE, EXTENDED RELEASE ORAL DAILY
Qty: 30 CAP | Refills: 0 | Status: SHIPPED | OUTPATIENT
Start: 2018-07-19 | End: 2018-06-19 | Stop reason: SDUPTHER

## 2018-06-19 RX ORDER — DEXTROAMPHETAMINE SACCHARATE, AMPHETAMINE ASPARTATE MONOHYDRATE, DEXTROAMPHETAMINE SULFATE AND AMPHETAMINE SULFATE 5; 5; 5; 5 MG/1; MG/1; MG/1; MG/1
20 CAPSULE, EXTENDED RELEASE ORAL
Qty: 30 CAP | Refills: 0 | Status: SHIPPED | OUTPATIENT
Start: 2018-06-19 | End: 2018-06-19 | Stop reason: SDUPTHER

## 2018-06-19 RX ORDER — DEXTROAMPHETAMINE SACCHARATE, AMPHETAMINE ASPARTATE MONOHYDRATE, DEXTROAMPHETAMINE SULFATE AND AMPHETAMINE SULFATE 5; 5; 5; 5 MG/1; MG/1; MG/1; MG/1
20 CAPSULE, EXTENDED RELEASE ORAL DAILY
Qty: 30 CAP | Refills: 0 | Status: SHIPPED | OUTPATIENT
Start: 2018-08-19 | End: 2018-08-15 | Stop reason: SDUPTHER

## 2018-06-19 NOTE — MR AVS SNAPSHOT
[address] [facility] 
[phone] Patient: Sandra Thomas MRN: [id number] ZHT:1/0/5670 Visit Information Date & Time Provider Department Dept. Phone Encounter #  
 6/19/2018  8:30 AM Brenda Hendricks, [address] [facility] [phone] [id number] Follow-up Instructions Return in about 3 months (around 9/19/2018). Your Appointments 9/20/2018  8:30 AM  
ESTABLISHED PATIENT with Brenda Hendricks MD  
[address] [place] [address]) Appt Note: 3 month f/u  
 [facility] [address] [address] [address] [place] [address] [address] [facility] Upcoming Health Maintenance Date Due DTaP/Tdap/Td series (1 - Tdap) 5/4/2010 Influenza Age 5 to Adult 8/1/2018 Allergies as of 6/19/2018  Review Complete On: 6/19/2018 By: Gaila Spurling No Known Allergies Current Immunizations  Never Reviewed No immunizations on file. Not reviewed this visit You Were Diagnosed With   
  
 Codes Comments Attention deficit disorder (ADD) without hyperactivity    -  Primary ICD-10-CM: F98.8 ICD-9-CM: 314.00 Anxiety     ICD-10-CM: F41.9 ICD-9-CM: 300.00 Vitals BP Pulse Height(growth percentile) Weight(growth percentile) BMI Smoking Status 125/81 83 6' 2\" (1.88 m) 260 lb (117.9 kg) 33.38 kg/m2 Never Smoker BMI and BSA Data Body Mass Index Body Surface Area  
 33.38 kg/m 2 2.48 m 2 Preferred Pharmacy Pharmacy Name Phone 310 [facility] 53 [address] ([address] [phone] Your Updated Medication List  
  
   
This list is accurate as of 6/19/18  8:41 AM.  Always use your most recent med list.  
  
  
  
  
 amphetamine-dextroamphetamine XR 20 mg XR capsule Commonly known as:  ADDERALL XR  
 Take 1 Cap (20 mg total) by mouth dailyEarliest Fill Date: 8/19/18. Max Daily Amount: 20 mg  
Start taking on:  8/19/2018 VITAMIN C PO Take  by mouth. Prescriptions Printed Refills  
 amphetamine-dextroamphetamine XR (ADDERALL XR) 20 mg XR capsule 0 Starting on: 8/19/2018 Sig: Take 1 Cap (20 mg total) by mouth dailyEarliest Fill Date: 8/19/18. Max Daily Amount: 20 mg  
 Class: Print Route: Oral  
  
Follow-up Instructions Return in about 3 months (around 9/19/2018). Introducing [facility] & HEALTH SERVICES! [facility] introduces [employer] patient portal. Now you can access parts of your medical record, email your doctor's office, and request medication refills online. 1. In your internet browser, go to [email or web address] 2. Click on the First Time User? Click Here link in the Sign In box. You will see the New Member Sign Up page. 3. Enter your [employer] Access Code exactly as it appears below. You will not need to use this code after youve completed the sign-up process. If you do not sign up before the expiration date, you must request a new code. · [employer] Access Code: [email or web address] Expires: 9/17/2018  8:41 AM 
 
4. Enter the last four digits of your Social Security Number (xxxx) and Date of Birth (mm/dd/yyyy) as indicated and click Submit. You will be taken to the next sign-up page. 5. Create a [employer] ID. This will be your [employer] login ID and cannot be changed, so think of one that is secure and easy to remember. 6. Create a [employer] password. You can change your password at any time. 7. Enter your Password Reset Question and Answer. This can be used at a later time if you forget your password. 8. Enter your e-mail address. You will receive e-mail notification when new information is available in [address] 9. Click Sign Up. You can now view and download portions of your medical record. 10. Click the Download Summary menu link to download a portable copy of your medical information. If you have questions, please visit the Frequently Asked Questions section of the [employer] website. Remember, [employer] is NOT to be used for urgent needs. For medical emergencies, dial 911. Now available from your iPhone and Android! Please provide this summary of care documentation to your next provider. If you have any questions after today's visit, please call [phone].

## 2018-06-19 NOTE — PROGRESS NOTES
Psychiatric Outpatient Progress Note    Account Number:  [de-identified]  Name: Swathi Hobbs    SUBJECTIVE:    CHIEF COMPLAINT:  Josué Washington a 28 y. o. Single, White male and was seen today for his 3 month follow-up of psychiatric condition and psychotropic medication management.       HPI:    Manpreet reports the following psychiatric symptoms:  anxiety and ADD.  The symptoms have been present for years  and are of mild severity. The symptoms occur daily.      Pt  reports that he is doing good. He is stressed out by the fact that his last 3 assignments on the job fell apart and his recent request to go to [facility] was denied. Reports compliance with medication. Stable sleep/appetite. No gross psychosis or briana. Reports work is good and busy. It continues to get reorganized. Relationship with GF is going well. His stepbrother with substance use problem is doing better and is now in some kind of a treatment.      He has lost 5 lbs and his BP is WNL. BMI = 32. He is in good shape physically.       Contributing factors include: none     Patient denies SI/HI/SIB.        Side Effects:  none        Fam/Soc Hx (from Inial Eval with updates):        REVIEW OF SYSTEMS:  Constitutional: negative for fevers and fatigue  Eyes: positive for contacts/glasses  Ears, nose, mouth, throat, and face: negative for hearing loss and tinnitus  Respiratory: negative for cough or wheezing  Musculoskeletal:negative for myalgias and arthralgias  Neurological: negative for headaches and memory problems  Behavioral/Psych: positive for ADHD and anxiety, negative for SI or HI  Appetite:good   Sleep: good     Visit Vitals    /81    Pulse 83    Ht 6' 2\" (1.88 m)    Wt 117.9 kg (260 lb)    BMI 33.38 kg/m2       OBJECTIVE:                 Mental Status exam: WNL except for      Sensorium  oriented to time, place and person   Relations cooperative    Eye Contact    appropriate   Appearance:  age appropriate and casually dressed   Motor Behavior/Gait:  within normal limits   Speech:  normal pitch and normal volume   Thought Process: goal directed and logical   Thought Content free of delusions and free of hallucinations   Suicidal ideations none   Homicidal ideations none   Mood:  euthymic   Affect:  full range   Memory recent  adequate   Memory remote:  adequate   Concentration:  adequate   Abstraction:  concrete   Insight:  fair   Reliability fair   Judgment:  fair       MEDICAL DECISION MAKING  Data: pertinent labs, imaging, medical records and diagnostic tests reviewed and incorporated in diagnosis and treatment plan    No Known Allergies     Current Outpatient Prescriptions   Medication Sig Dispense Refill    [START ON 8/19/2018] amphetamine-dextroamphetamine XR (ADDERALL XR) 20 mg XR capsule Take 1 Cap (20 mg total) by mouth dailyEarliest Fill Date: 8/19/18. Max Daily Amount: 20 mg 30 Cap 0    ASCORBATE CALCIUM (VITAMIN C PO) Take  by mouth. Problems addressed today:    ICD-10-CM ICD-9-CM    1. Attention deficit disorder (ADD) without hyperactivity F98.8 314.00 amphetamine-dextroamphetamine XR (ADDERALL XR) 20 mg XR capsule      DISCONTINUED: amphetamine-dextroamphetamine XR (ADDERALL XR) 20 mg XR capsule      DISCONTINUED: amphetamine-dextroamphetamine XR (ADDERALL XR) 20 mg XR capsule   2. Anxiety F41.9 300.00        Assessment:   Brandie Sutton  is a 33 y.o.  male  is responding to treatment. Symptoms are stable. Patient denies SI/HI/SIB. No evidence of AH/VH or delusions. Risk Scoring- chronic illnesses and prescription drug management    Treatment Plan:  1. Medications:          Medication Changes/Adjustments: continue Adderall in the current dose. Current Outpatient Prescriptions   Medication Sig Dispense Refill    [START ON 8/19/2018] amphetamine-dextroamphetamine XR (ADDERALL XR) 20 mg XR capsule Take 1 Cap (20 mg total) by mouth dailyEarliest Fill Date: 8/19/18.   Max Daily Amount: 20 mg 30 Cap 0    ASCORBATE CALCIUM (VITAMIN C PO) Take  by mouth. The following regarding medications was addressed:    (The risks and benefits of the proposed medication; the potential medication side effects ie    dry mouth, weight gain, GI upset, headache; patient given opportunity to ask questions)       2. Counseling and coordination of care including instructions for treatment, risks/benefits, risk factor reduction and patient/family education. He agrees with the plan. Patient instructed to call with any side effects, questions or issues. 3.  Patient was provided supportive counseling for his stress of not finding a proper assignment on his job. He was commended on staying in touch with his stepbrother who has substance use problems. PSYCHOTHERAPY:  approx 20 minutes  Type:  Supportive/Cognitive Behavioral/Solution Focused psychotherapy provided  Focus:     Current problems:   Occupational issues    Psychoeducation provided: psych medications    Treatment plan reviewed with patient-including diagnosis and medications    Zoya Guevara is stressful. Follow-up Disposition:  Return in about 3 months (around 9/19/2018).       Una Sung MD  6/19/2018

## 2018-08-15 ENCOUNTER — TELEPHONE (OUTPATIENT)
Dept: BEHAVIORAL/MENTAL HEALTH CLINIC | Age: 28
End: 2018-08-15

## 2018-08-15 DIAGNOSIS — F98.8 ATTENTION DEFICIT DISORDER (ADD) WITHOUT HYPERACTIVITY: ICD-10-CM

## 2018-08-15 RX ORDER — DEXTROAMPHETAMINE SACCHARATE, AMPHETAMINE ASPARTATE MONOHYDRATE, DEXTROAMPHETAMINE SULFATE AND AMPHETAMINE SULFATE 5; 5; 5; 5 MG/1; MG/1; MG/1; MG/1
20 CAPSULE, EXTENDED RELEASE ORAL DAILY
Qty: 30 CAP | Refills: 0 | Status: SHIPPED | OUTPATIENT
Start: 2018-08-19

## 2018-08-15 NOTE — TELEPHONE ENCOUNTER
Pt has relocated to [facility] due to his job, it was very sudden. He has asked us to call in his script to [facility], which he says takes about 5 days to get to him. The start date on his Adderrall is not until 8/19 and he says he will be out of meds by the time it gets to him and he was wondering if we can do an early fill date on it that way it is delivered to him in time.